# Patient Record
Sex: MALE | Race: WHITE | URBAN - METROPOLITAN AREA
[De-identification: names, ages, dates, MRNs, and addresses within clinical notes are randomized per-mention and may not be internally consistent; named-entity substitution may affect disease eponyms.]

---

## 2018-07-02 ENCOUNTER — OFFICE VISIT (OUTPATIENT)
Dept: NEUROSURGERY | Age: 83
End: 2018-07-02
Payer: MEDICARE

## 2018-07-02 VITALS
BODY MASS INDEX: 27.31 KG/M2 | DIASTOLIC BLOOD PRESSURE: 60 MMHG | WEIGHT: 174 LBS | HEIGHT: 67 IN | SYSTOLIC BLOOD PRESSURE: 124 MMHG | HEART RATE: 65 BPM

## 2018-07-02 DIAGNOSIS — S22.070A CLOSED WEDGE COMPRESSION FRACTURE OF TENTH THORACIC VERTEBRA, INITIAL ENCOUNTER: Primary | ICD-10-CM

## 2018-07-02 PROCEDURE — 99204 OFFICE O/P NEW MOD 45 MIN: CPT | Performed by: NEUROLOGICAL SURGERY

## 2018-07-02 RX ORDER — GLUCOSAMINE HCL 500 MG
5000 TABLET ORAL
COMMUNITY

## 2018-07-02 RX ORDER — FINASTERIDE 5 MG/1
5 TABLET, FILM COATED ORAL DAILY
COMMUNITY

## 2018-07-02 RX ORDER — ISOSORBIDE DINITRATE AND HYDRALAZINE HYDROCHLORIDE 37.5; 2 MG/1; MG/1
1 TABLET ORAL 3 TIMES DAILY
COMMUNITY

## 2018-07-02 RX ORDER — CALCIUM CARBONATE 500(1250)
62020 TABLET ORAL DAILY
COMMUNITY

## 2018-07-02 RX ORDER — MIRTAZAPINE 30 MG/1
30 TABLET, FILM COATED ORAL NIGHTLY
COMMUNITY

## 2018-07-02 RX ORDER — CLOPIDOGREL BISULFATE 75 MG/1
75 TABLET ORAL DAILY
COMMUNITY

## 2018-07-02 RX ORDER — LOSARTAN POTASSIUM 100 MG/1
100 TABLET ORAL DAILY
COMMUNITY

## 2018-07-02 RX ORDER — RANOLAZINE 1000 MG/1
500 TABLET, EXTENDED RELEASE ORAL 2 TIMES DAILY
COMMUNITY

## 2018-07-02 RX ORDER — LATANOPROST 50 UG/ML
1 SOLUTION/ DROPS OPHTHALMIC NIGHTLY
COMMUNITY

## 2018-07-02 RX ORDER — METOPROLOL SUCCINATE 25 MG/1
25 TABLET, EXTENDED RELEASE ORAL DAILY
COMMUNITY

## 2018-07-02 ASSESSMENT — ENCOUNTER SYMPTOMS
VOMITING: 0
PHOTOPHOBIA: 0
STRIDOR: 0
BACK PAIN: 1
SHORTNESS OF BREATH: 0
NAUSEA: 0
BLURRED VISION: 0
COUGH: 0

## 2018-07-02 NOTE — PROGRESS NOTES
1,103 mg by mouth 2 times daily      Cholecalciferol (VITAMIN D3) 3000 units TABS Take 5,000 mg by mouth      clopidogrel (PLAVIX) 75 MG tablet Take 75 mg by mouth daily      isosorbide-hydrALAZINE (BIDIL) 20-37.5 MG per tablet Take 1 tablet by mouth 3 times daily      losartan (COZAAR) 100 MG tablet Take 100 mg by mouth daily      ranolazine (RANEXA) 1000 MG extended release tablet Take 500 mg by mouth 2 times daily      mirtazapine (REMERON) 30 MG tablet Take 30 mg by mouth nightly      metoprolol succinate (TOPROL XL) 25 MG extended release tablet Take 25 mg by mouth daily      finasteride (PROSCAR) 5 MG tablet Take 5 mg by mouth daily      calcium carbonate (OSCAL) 500 MG TABS tablet Take 62,020 mg by mouth daily      latanoprost (XALATAN) 0.005 % ophthalmic solution 1 drop nightly      cycloSPORINE (SANDIMMUNE) 100 MG/ML solution Take 100 mg by mouth 2 times daily       No current facility-administered medications for this visit. Allergies: Allergies as of 07/02/2018 - never reviewed   Allergen Reaction Noted    Ativan [lorazepam]  07/02/2018    Neosporin [neomycin-polymyxin-gramicidin]  07/02/2018    Penicillins  07/02/2018          Review of Systems   Constitutional: Negative for chills and fever. HENT: Positive for hearing loss. Negative for congestion and tinnitus. Eyes: Negative for blurred vision and photophobia. Respiratory: Negative for cough, shortness of breath and stridor. Cardiovascular: Negative for chest pain and palpitations. Gastrointestinal: Negative for nausea and vomiting. Genitourinary: Negative for flank pain. Musculoskeletal: Positive for back pain. Negative for falls and myalgias. Skin: Negative for itching and rash. Neurological: Negative for dizziness, tremors and speech change. Endo/Heme/Allergies: Does not bruise/bleed easily. Psychiatric/Behavioral: Negative for memory loss. The patient does not have insomnia.          Physical Exam Constitutional: He appears well-nourished. HENT:   Head: Normocephalic and atraumatic. Hearing aids   Eyes: EOM are normal. Pupils are equal, round, and reactive to light. Neck: Normal range of motion. No tracheal deviation present. Cardiovascular: Normal rate. Pulmonary/Chest: Breath sounds normal. No stridor. Abdominal: He exhibits no distension. Neurological: He is alert. Face symmetric  Motor strength full  Sensation intact to light touch    Tenderness to palpation of the mid back   Skin: Skin is warm and dry. Psychiatric: Thought content normal.        /60   Pulse 65   Ht 5' 7\" (1.702 m)   Wt 174 lb (78.9 kg)   BMI 27.25 kg/m²        Assessment / Plan:   New problem: T9 and T10 compression fracture    Rafe Rubinstein is 81years old. He has history of hypertension, cardiac bypass surgery, and history of 7 cardiac stents. He is currently on Plavix. He feels that his back pain has been improving. I discussed kyphoplasty versus bracing with the patient. At this point he wishes to try bracing. I have ordered him a soft TLSO. He will plan to wear the soft TLSO for 3 months when out of bed. If the TLSO does not control his pain, he will call the neurosurgery office and then will be scheduled for kyphoplasty. He would have to stop Plavix one week before the procedure. In addition I would obtain MRI of the thoracic spine with images counting from lumbar spine for localization prior to the kyphoplasty. Douglas Jean was seen today for back pain. Diagnoses and all orders for this visit:    Closed wedge compression fracture of tenth thoracic vertebra, initial encounter [facility])  -     External Referral For Orthotics      Thank you for involving me in the care of Rafe Rubinstein.     Emmanuel Dumont MD   Pager: [phone]

## 2021-04-30 ENCOUNTER — OFFICE VISIT (OUTPATIENT)
Dept: URBAN - METROPOLITAN AREA CLINIC 30 | Facility: CLINIC | Age: 86
End: 2021-04-30
Payer: COMMERCIAL

## 2021-04-30 DIAGNOSIS — H35.62 RETINAL HEMORRHAGE, LEFT EYE: ICD-10-CM

## 2021-04-30 DIAGNOSIS — Z96.1 PRESENCE OF PSEUDOPHAKIA: ICD-10-CM

## 2021-04-30 DIAGNOSIS — H04.123 DRY EYE SYNDROME OF BILATERAL LACRIMAL GLANDS: ICD-10-CM

## 2021-04-30 DIAGNOSIS — H40.1134 PRIMARY OPEN-ANGLE GLAUCOMA, BILATERAL, INDETERMINATE STAGE: Primary | ICD-10-CM

## 2021-04-30 DIAGNOSIS — H43.813 VITREOUS DEGENERATION, BILATERAL: ICD-10-CM

## 2021-04-30 PROCEDURE — 92134 CPTRZ OPH DX IMG PST SGM RTA: CPT | Performed by: OPTOMETRIST

## 2021-04-30 PROCEDURE — 92133 CPTRZD OPH DX IMG PST SGM ON: CPT | Performed by: OPTOMETRIST

## 2021-04-30 PROCEDURE — 92004 COMPRE OPH EXAM NEW PT 1/>: CPT | Performed by: OPTOMETRIST

## 2021-04-30 ASSESSMENT — KERATOMETRY
OD: 41.55
OS: 41.13

## 2021-04-30 ASSESSMENT — VISUAL ACUITY
OS: 20/30
OD: 20/30

## 2021-04-30 ASSESSMENT — INTRAOCULAR PRESSURE
OS: 11
OD: 12

## 2021-04-30 NOTE — IMPRESSION/PLAN
Impression: Retinal hemorrhage, left eye: H35.62. Plan: Hemes visible on exam, no apparent SRF on OCT. Pt notes long h/o leaky blood vessel OS, since probably 2011 per pt. He does note some distortion OS if isolates OS. Call if changes in vision occur.

## 2021-04-30 NOTE — IMPRESSION/PLAN
Impression: Vitreous degeneration, bilateral: H43.813. Plan: Pt educ on findings. Retinas flat and attached OU. Reviewed signs and symptoms of RD and to call asap if occurs.

## 2021-04-30 NOTE — IMPRESSION/PLAN
Impression: Primary open-angle glaucoma, bilateral, indeterminate stage: H40.1134. PACHS [id number] Plan: Patient has not had eye care for  2 years. On tx- Xalatan qhs OU for ~ 20 years, moved from [place] [id number]. Patient's wife instills gtts. IOPs low. RNFL 4/21 WNL (88/89) IOP check c VF in 4 months.

## 2021-04-30 NOTE — IMPRESSION/PLAN
Impression: Dry eye syndrome of bilateral lacrimal glands: H04.123. Plan: Patient using ATs prn. Discussed consistent daily use.